# Patient Record
(demographics unavailable — no encounter records)

---

## 2025-05-12 NOTE — PHYSICAL EXAM
[Appropriately responsive] : appropriately responsive [Alert] : alert [No Acute Distress] : no acute distress [No Lymphadenopathy] : no lymphadenopathy [Regular Rate Rhythm] : regular rate rhythm [No Murmurs] : no murmurs [Clear to Auscultation B/L] : clear to auscultation bilaterally [Soft] : soft [Non-tender] : non-tender [Non-distended] : non-distended [No HSM] : No HSM [No Lesions] : no lesions [No Mass] : no mass [Oriented x3] : oriented x3 [FreeTextEntry1] : Normal, no lesions, thin walls seen at the vaginal opening [FreeTextEntry2] : Normal, no lesions [FreeTextEntry4] : Normal, no lesions seen or palpated.  Scanty small amount of white discharge in vault [FreeTextEntry5] : Smooth, pink, no lesions.  No cervical motion tenderness [FreeTextEntry6] : Retroverted, small, mobile, nontender.  Mild soreness in the left adnexa, but no masses.  No right sided pain or masses palpated.

## 2025-05-12 NOTE — PLAN
[FreeTextEntry1] : Pap smear drawn and sent. Prescription for renewal of low Loestrin sent to the pharmacy.  Will also start applying estradiol cream to the vaginal opening twice a week to see if helps replete the discomfort that she has when she uses tampons or after intercourse. Prescription for mammogram and breast ultrasound given to be done in October.

## 2025-05-12 NOTE — HISTORY OF PRESENT ILLNESS
[Patient reported PAP Smear was normal] : Patient reported PAP Smear was normal [Y] : Positive pregnancy history [Currently Active] : currently active [Men] : men [Yes] : Yes [Mammogramdate] : 10/24 [TextBox_19] : Z&P- with sono- Benign [PapSmeardate] : 04/17/24 [TextBox_31] : Negative [BoneDensityDate] : N/A [LMPDate] : 05/03/25 [PGHxTotal] : 2 [[facility]] : 1 [[facility] Center[facility]] : 1 [PGHxABSpont] : 1 [FreeTextEntry1] :  SECTION X 1 [TextBox_28] : H/O adenomyosis and menorrhagia; on OCP's to treat.  [FreeTextEntry3] : OCP